# Patient Record
Sex: MALE | Race: WHITE | URBAN - METROPOLITAN AREA
[De-identification: names, ages, dates, MRNs, and addresses within clinical notes are randomized per-mention and may not be internally consistent; named-entity substitution may affect disease eponyms.]

---

## 2023-05-19 ENCOUNTER — OFFICE (OUTPATIENT)
Dept: URBAN - METROPOLITAN AREA CLINIC 102 | Facility: CLINIC | Age: 88
End: 2023-05-19
Payer: COMMERCIAL

## 2023-05-19 VITALS
HEART RATE: 54 BPM | WEIGHT: 184 LBS | TEMPERATURE: 97.7 F | HEIGHT: 69 IN | DIASTOLIC BLOOD PRESSURE: 75 MMHG | SYSTOLIC BLOOD PRESSURE: 147 MMHG

## 2023-05-19 DIAGNOSIS — R19.7 DIARRHEA, UNSPECIFIED: ICD-10-CM

## 2023-05-19 PROCEDURE — 99204 OFFICE O/P NEW MOD 45 MIN: CPT | Performed by: NURSE PRACTITIONER

## 2023-05-24 LAB
C DIFFICILE TOXIN GENE NAA: NEGATIVE
CALPROTECTIN, FECAL: 7 UG/G (ref 0–120)
GIARDIA LAMBLIA AG, EIA: NEGATIVE
OVA + PARASITE EXAM: (no result)
PANCREATIC ELASTASE, FECAL: <50 UG ELAST./G — LOW
RESULT: RESULT 1: (no result)
STOOL CULTURE: CAMPYLOBACTER CULTURE: (no result)
STOOL CULTURE: E COLI SHIGA TOXIN EIA: NEGATIVE
STOOL CULTURE: SALMONELLA/SHIGELLA SCREEN: (no result)